# Patient Record
Sex: MALE | Race: OTHER
[De-identification: names, ages, dates, MRNs, and addresses within clinical notes are randomized per-mention and may not be internally consistent; named-entity substitution may affect disease eponyms.]

---

## 2019-01-05 ENCOUNTER — HOSPITAL ENCOUNTER (EMERGENCY)
Dept: HOSPITAL 1 - ED | Age: 39
Discharge: HOME | End: 2019-01-05
Payer: SELF-PAY

## 2019-01-05 VITALS — DIASTOLIC BLOOD PRESSURE: 90 MMHG | SYSTOLIC BLOOD PRESSURE: 145 MMHG

## 2019-01-05 VITALS
BODY MASS INDEX: 33.07 KG/M2 | BODY MASS INDEX: 33.07 KG/M2 | WEIGHT: 223.31 LBS | HEIGHT: 69 IN | WEIGHT: 223.31 LBS | HEIGHT: 69 IN
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
## 2019-01-05 DIAGNOSIS — T62.8X1A: Primary | ICD-10-CM

## 2021-09-08 ENCOUNTER — HOSPITAL ENCOUNTER (EMERGENCY)
Dept: HOSPITAL 15 - ER | Age: 41
Discharge: LEFT BEFORE DISCHARGE | End: 2021-09-08
Payer: COMMERCIAL

## 2021-09-08 VITALS — DIASTOLIC BLOOD PRESSURE: 96 MMHG | SYSTOLIC BLOOD PRESSURE: 145 MMHG

## 2021-09-08 VITALS — BODY MASS INDEX: 33.33 KG/M2 | WEIGHT: 225 LBS | HEIGHT: 69 IN

## 2021-09-08 DIAGNOSIS — F15.10: ICD-10-CM

## 2021-09-08 DIAGNOSIS — Z53.29: ICD-10-CM

## 2021-09-08 DIAGNOSIS — T40.411A: Primary | ICD-10-CM

## 2021-09-08 DIAGNOSIS — F11.10: ICD-10-CM

## 2021-09-08 DIAGNOSIS — F12.10: ICD-10-CM
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
## 2021-09-08 LAB
ALBUMIN SERPL-MCNC: 3.8 G/DL (ref 3.4–5)
ALP SERPL-CCNC: 65 U/L (ref 45–117)
ALT SERPL-CCNC: 50 U/L (ref 16–61)
ANION GAP SERPL CALCULATED.3IONS-SCNC: 7 MMOL/L (ref 5–15)
BILIRUB SERPL-MCNC: 0.3 MG/DL (ref 0.2–1)
BUN SERPL-MCNC: 15 MG/DL (ref 7–18)
BUN/CREAT SERPL: 14.4
CALCIUM SERPL-MCNC: 8.6 MG/DL (ref 8.5–10.1)
CHLORIDE SERPL-SCNC: 104 MMOL/L (ref 98–107)
CK SERPL-CCNC: 269 U/L (ref 39–308)
CO2 SERPL-SCNC: 26 MMOL/L (ref 21–32)
GLUCOSE SERPL-MCNC: 106 MG/DL (ref 74–106)
HCT VFR BLD AUTO: 41 % (ref 41–53)
HGB BLD-MCNC: 13.7 G/DL (ref 13.5–17.5)
MAGNESIUM SERPL-MCNC: 2.2 MG/DL (ref 1.6–2.6)
MCH RBC QN AUTO: 31.3 PG (ref 28–32)
MCV RBC AUTO: 93.2 FL (ref 80–100)
NRBC BLD QL AUTO: 0 %
POTASSIUM SERPL-SCNC: 4.8 MMOL/L (ref 3.5–5.1)
PROT SERPL-MCNC: 7.5 G/DL (ref 6.4–8.2)
SODIUM SERPL-SCNC: 137 MMOL/L (ref 136–145)

## 2021-09-08 PROCEDURE — 80320 DRUG SCREEN QUANTALCOHOLS: CPT

## 2021-09-08 PROCEDURE — 36415 COLL VENOUS BLD VENIPUNCTURE: CPT

## 2021-09-08 PROCEDURE — 85025 COMPLETE CBC W/AUTO DIFF WBC: CPT

## 2021-09-08 PROCEDURE — 83735 ASSAY OF MAGNESIUM: CPT

## 2021-09-08 PROCEDURE — 80053 COMPREHEN METABOLIC PANEL: CPT

## 2021-09-08 PROCEDURE — 82550 ASSAY OF CK (CPK): CPT

## 2022-02-17 ENCOUNTER — HOSPITAL ENCOUNTER (EMERGENCY)
Dept: HOSPITAL 15 - ER | Age: 42
Discharge: LEFT BEFORE DISCHARGE | End: 2022-02-17
Payer: SELF-PAY

## 2022-02-17 VITALS — DIASTOLIC BLOOD PRESSURE: 88 MMHG | SYSTOLIC BLOOD PRESSURE: 142 MMHG

## 2022-02-17 VITALS — WEIGHT: 225 LBS | BODY MASS INDEX: 33.33 KG/M2 | HEIGHT: 69 IN

## 2022-02-17 DIAGNOSIS — L02.611: Primary | ICD-10-CM

## 2022-02-17 DIAGNOSIS — Z53.21: ICD-10-CM

## 2022-02-18 ENCOUNTER — HOSPITAL ENCOUNTER (EMERGENCY)
Dept: HOSPITAL 15 - ER | Age: 42
Discharge: HOME | End: 2022-02-18
Payer: SELF-PAY

## 2022-02-18 VITALS — BODY MASS INDEX: 33.33 KG/M2 | HEIGHT: 69 IN | WEIGHT: 225 LBS

## 2022-02-18 VITALS — DIASTOLIC BLOOD PRESSURE: 92 MMHG | SYSTOLIC BLOOD PRESSURE: 120 MMHG

## 2022-02-18 DIAGNOSIS — F17.200: ICD-10-CM
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
## 2022-02-18 DIAGNOSIS — F15.10: ICD-10-CM

## 2022-02-18 DIAGNOSIS — F12.10: ICD-10-CM

## 2022-02-18 DIAGNOSIS — S80.861A: ICD-10-CM

## 2022-02-18 DIAGNOSIS — F11.10: ICD-10-CM

## 2022-02-18 DIAGNOSIS — S80.862A: Primary | ICD-10-CM

## 2022-03-05 ENCOUNTER — HOSPITAL ENCOUNTER (EMERGENCY)
Dept: HOSPITAL 15 - ER | Age: 42
Discharge: HOME | End: 2022-03-05
Payer: SELF-PAY

## 2022-03-05 VITALS — HEIGHT: 69 IN | WEIGHT: 225 LBS | BODY MASS INDEX: 33.33 KG/M2

## 2022-03-05 VITALS — DIASTOLIC BLOOD PRESSURE: 90 MMHG | SYSTOLIC BLOOD PRESSURE: 133 MMHG
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
## 2022-03-05 DIAGNOSIS — S80.862A: Primary | ICD-10-CM
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
## 2022-03-05 DIAGNOSIS — F12.10: ICD-10-CM
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
## 2022-03-05 DIAGNOSIS — S80.861A: ICD-10-CM

## 2022-03-05 DIAGNOSIS — F15.10: ICD-10-CM

## 2022-03-05 PROCEDURE — 99284 EMERGENCY DEPT VISIT MOD MDM: CPT

## 2022-03-05 PROCEDURE — 96372 THER/PROPH/DIAG INJ SC/IM: CPT

## 2022-04-18 ENCOUNTER — HOSPITAL ENCOUNTER (EMERGENCY)
Dept: HOSPITAL 15 - ER | Age: 42
Discharge: LEFT BEFORE DISCHARGE | End: 2022-04-18
Payer: SELF-PAY

## 2022-04-18 VITALS — BODY MASS INDEX: 33.33 KG/M2 | WEIGHT: 225 LBS | HEIGHT: 69 IN

## 2022-04-18 VITALS — SYSTOLIC BLOOD PRESSURE: 113 MMHG | DIASTOLIC BLOOD PRESSURE: 95 MMHG
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
## 2022-04-18 DIAGNOSIS — Z53.21: ICD-10-CM

## 2022-04-18 DIAGNOSIS — T17.228A: Primary | ICD-10-CM

## 2022-04-18 PROCEDURE — 70360 X-RAY EXAM OF NECK: CPT

## 2022-04-19 ENCOUNTER — HOSPITAL ENCOUNTER (EMERGENCY)
Dept: HOSPITAL 15 - ER | Age: 42
Discharge: HOME | End: 2022-04-19
Payer: SELF-PAY

## 2022-04-19 VITALS — HEIGHT: 69 IN | BODY MASS INDEX: 33.33 KG/M2 | WEIGHT: 225 LBS

## 2022-04-19 VITALS — DIASTOLIC BLOOD PRESSURE: 99 MMHG | SYSTOLIC BLOOD PRESSURE: 144 MMHG

## 2022-04-19 DIAGNOSIS — J02.9: Primary | ICD-10-CM

## 2022-10-26 ENCOUNTER — HOSPITAL ENCOUNTER (EMERGENCY)
Dept: HOSPITAL 15 - ER | Age: 42
LOS: 1 days | Discharge: HOME | End: 2022-10-27
Payer: SELF-PAY

## 2022-10-26 VITALS — BODY MASS INDEX: 26.78 KG/M2 | WEIGHT: 180.78 LBS | HEIGHT: 69 IN

## 2022-10-26 VITALS — SYSTOLIC BLOOD PRESSURE: 146 MMHG | DIASTOLIC BLOOD PRESSURE: 91 MMHG

## 2022-10-26 DIAGNOSIS — Z79.1: ICD-10-CM

## 2022-10-26 DIAGNOSIS — F11.10: ICD-10-CM

## 2022-10-26 DIAGNOSIS — F12.10: ICD-10-CM

## 2022-10-26 DIAGNOSIS — Z79.2: ICD-10-CM

## 2022-10-26 DIAGNOSIS — F15.10: ICD-10-CM

## 2022-10-26 DIAGNOSIS — Z79.899: ICD-10-CM

## 2022-10-26 DIAGNOSIS — L03.113: ICD-10-CM

## 2022-10-26 DIAGNOSIS — Z59.00: ICD-10-CM

## 2022-10-26 DIAGNOSIS — L03.116: Primary | ICD-10-CM

## 2022-10-26 LAB
ALBUMIN SERPL-MCNC: 3.3 G/DL (ref 3.4–5)
ALP SERPL-CCNC: 92 U/L (ref 45–117)
ALT SERPL-CCNC: 26 U/L (ref 16–61)
ANION GAP SERPL CALCULATED.3IONS-SCNC: 7 MMOL/L (ref 5–15)
BILIRUB SERPL-MCNC: 0.4 MG/DL (ref 0.2–1)
BUN SERPL-MCNC: 11 MG/DL (ref 7–18)
BUN/CREAT SERPL: 10.4
CALCIUM SERPL-MCNC: 8.7 MG/DL (ref 8.5–10.1)
CHLORIDE SERPL-SCNC: 111 MMOL/L (ref 98–107)
CO2 SERPL-SCNC: 25 MMOL/L (ref 21–32)
GLUCOSE SERPL-MCNC: 101 MG/DL (ref 74–106)
HCT VFR BLD AUTO: 38.9 % (ref 41–53)
HGB BLD-MCNC: 13.3 G/DL (ref 13.5–17.5)
MCH RBC QN AUTO: 31.3 PG (ref 28–32)
MCV RBC AUTO: 91.8 FL (ref 80–100)
NRBC BLD QL AUTO: 0 %
POTASSIUM SERPL-SCNC: 4 MMOL/L (ref 3.5–5.1)
PROT SERPL-MCNC: 7.4 G/DL (ref 6.4–8.2)
SODIUM SERPL-SCNC: 143 MMOL/L (ref 136–145)

## 2022-10-26 PROCEDURE — 80053 COMPREHEN METABOLIC PANEL: CPT

## 2022-10-26 PROCEDURE — 99283 EMERGENCY DEPT VISIT LOW MDM: CPT

## 2022-10-26 PROCEDURE — 83605 ASSAY OF LACTIC ACID: CPT

## 2022-10-26 PROCEDURE — 36415 COLL VENOUS BLD VENIPUNCTURE: CPT

## 2022-10-26 PROCEDURE — 85025 COMPLETE CBC W/AUTO DIFF WBC: CPT

## 2022-10-26 PROCEDURE — 87040 BLOOD CULTURE FOR BACTERIA: CPT

## 2022-10-26 SDOH — ECONOMIC STABILITY - HOUSING INSECURITY: HOMELESSNESS UNSPECIFIED: Z59.00

## 2023-08-18 ENCOUNTER — HOSPITAL ENCOUNTER (EMERGENCY)
Dept: HOSPITAL 15 - ER | Age: 43
Discharge: HOME | End: 2023-08-18
Payer: MEDICAID

## 2023-08-18 VITALS
HEART RATE: 102 BPM | DIASTOLIC BLOOD PRESSURE: 85 MMHG | RESPIRATION RATE: 18 BRPM | SYSTOLIC BLOOD PRESSURE: 138 MMHG | TEMPERATURE: 98.5 F | OXYGEN SATURATION: 96 %

## 2023-08-18 VITALS — HEIGHT: 69 IN | BODY MASS INDEX: 30.24 KG/M2 | WEIGHT: 204.15 LBS

## 2023-08-18 DIAGNOSIS — L08.9: ICD-10-CM

## 2023-08-18 DIAGNOSIS — S60.512D: Primary | ICD-10-CM

## 2023-08-18 DIAGNOSIS — Z79.899: ICD-10-CM

## 2023-08-18 DIAGNOSIS — Z79.1: ICD-10-CM

## 2023-08-18 DIAGNOSIS — Z79.2: ICD-10-CM
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
## 2023-08-18 PROCEDURE — 90715 TDAP VACCINE 7 YRS/> IM: CPT

## 2023-08-18 PROCEDURE — 90471 IMMUNIZATION ADMIN: CPT

## 2023-12-21 ENCOUNTER — HOSPITAL ENCOUNTER (EMERGENCY)
Dept: HOSPITAL 15 - ER | Age: 43
Discharge: LEFT BEFORE DISCHARGE | End: 2023-12-21
Payer: MEDICAID

## 2023-12-21 DIAGNOSIS — Z48.00: Primary | ICD-10-CM

## 2023-12-21 DIAGNOSIS — Z53.21: ICD-10-CM

## 2023-12-22 ENCOUNTER — HOSPITAL ENCOUNTER (EMERGENCY)
Dept: HOSPITAL 15 - ER | Age: 43
Discharge: HOME | End: 2023-12-22
Payer: MEDICAID

## 2023-12-22 VITALS
HEART RATE: 104 BPM | OXYGEN SATURATION: 96 % | SYSTOLIC BLOOD PRESSURE: 130 MMHG | DIASTOLIC BLOOD PRESSURE: 84 MMHG | TEMPERATURE: 97.2 F | RESPIRATION RATE: 16 BRPM

## 2023-12-22 VITALS — HEIGHT: 69 IN | WEIGHT: 188.94 LBS | BODY MASS INDEX: 27.98 KG/M2

## 2023-12-22 DIAGNOSIS — S80.862A: Primary | ICD-10-CM
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
## 2023-12-22 DIAGNOSIS — Z87.891: ICD-10-CM
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
## 2023-12-22 DIAGNOSIS — Z79.899: ICD-10-CM

## 2023-12-22 DIAGNOSIS — F15.90: ICD-10-CM
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
## 2023-12-22 PROCEDURE — 96372 THER/PROPH/DIAG INJ SC/IM: CPT

## 2023-12-22 PROCEDURE — 99283 EMERGENCY DEPT VISIT LOW MDM: CPT

## 2025-01-15 ENCOUNTER — HOSPITAL ENCOUNTER (INPATIENT)
Dept: HOSPITAL 15 - ER | Age: 45
LOS: 3 days | Discharge: LEFT BEFORE DISCHARGE | DRG: 812 | End: 2025-01-18
Attending: STUDENT IN AN ORGANIZED HEALTH CARE EDUCATION/TRAINING PROGRAM | Admitting: STUDENT IN AN ORGANIZED HEALTH CARE EDUCATION/TRAINING PROGRAM
Payer: MEDICAID

## 2025-01-15 VITALS
SYSTOLIC BLOOD PRESSURE: 118 MMHG | TEMPERATURE: 98.9 F | DIASTOLIC BLOOD PRESSURE: 76 MMHG | OXYGEN SATURATION: 93 % | HEART RATE: 101 BPM | RESPIRATION RATE: 21 BRPM

## 2025-01-15 VITALS — RESPIRATION RATE: 20 BRPM | HEART RATE: 93 BPM | OXYGEN SATURATION: 88 %

## 2025-01-15 VITALS — RESPIRATION RATE: 21 BRPM | HEART RATE: 101 BPM | OXYGEN SATURATION: 93 %

## 2025-01-15 VITALS — OXYGEN SATURATION: 96 % | HEART RATE: 105 BPM | RESPIRATION RATE: 19 BRPM

## 2025-01-15 VITALS — BODY MASS INDEX: 38.8 KG/M2 | HEIGHT: 66 IN | WEIGHT: 241.41 LBS

## 2025-01-15 DIAGNOSIS — F19.10: ICD-10-CM

## 2025-01-15 DIAGNOSIS — Z83.3: ICD-10-CM

## 2025-01-15 DIAGNOSIS — Z59.00: ICD-10-CM

## 2025-01-15 DIAGNOSIS — F17.210: ICD-10-CM

## 2025-01-15 DIAGNOSIS — Z20.822: ICD-10-CM
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
## 2025-01-15 DIAGNOSIS — T50.991A: Primary | ICD-10-CM

## 2025-01-15 DIAGNOSIS — J69.0: ICD-10-CM

## 2025-01-15 DIAGNOSIS — R74.01: ICD-10-CM

## 2025-01-15 DIAGNOSIS — J96.01: ICD-10-CM

## 2025-01-15 DIAGNOSIS — E80.6: ICD-10-CM

## 2025-01-15 LAB
ANION GAP SERPL CALCULATED.3IONS-SCNC: 7 MMOL/L (ref 5–15)
BUN SERPL-MCNC: 28 MG/DL (ref 9–23)
BUN/CREAT SERPL: 22.4 (ref 10–20)
CALCIUM SERPL-MCNC: 9.2 MG/DL (ref 8.7–10.4)
CHLORIDE SERPL-SCNC: 110 MMOL/L (ref 98–107)
CO2 SERPL-SCNC: 24 MMOL/L (ref 20–31)
GLUCOSE SERPL-MCNC: 123 MG/DL (ref 74–106)
HCT VFR BLD AUTO: 39.6 % (ref 41–53)
HGB BLD-MCNC: 13.6 G/DL (ref 13.5–17.5)
MCH RBC QN AUTO: 32.3 PG (ref 28–32)
MCV RBC AUTO: 94 FL (ref 80–100)
NRBC BLD QL AUTO: 0.2 %
POTASSIUM SERPL-SCNC: 4.7 MMOL/L (ref 3.5–5.1)
PROT UR STRIP.AUTO-MCNC: (no result) MG/DL
SODIUM SERPL-SCNC: 141 MMOL/L (ref 136–145)

## 2025-01-15 PROCEDURE — 96375 TX/PRO/DX INJ NEW DRUG ADDON: CPT

## 2025-01-15 PROCEDURE — 86803 HEPATITIS C AB TEST: CPT

## 2025-01-15 PROCEDURE — 84439 ASSAY OF FREE THYROXINE: CPT

## 2025-01-15 PROCEDURE — 81001 URINALYSIS AUTO W/SCOPE: CPT

## 2025-01-15 PROCEDURE — 36415 COLL VENOUS BLD VENIPUNCTURE: CPT

## 2025-01-15 PROCEDURE — 85025 COMPLETE CBC W/AUTO DIFF WBC: CPT

## 2025-01-15 PROCEDURE — 86704 HEP B CORE ANTIBODY TOTAL: CPT

## 2025-01-15 PROCEDURE — 84481 FREE ASSAY (FT-3): CPT

## 2025-01-15 PROCEDURE — 80048 BASIC METABOLIC PNL TOTAL CA: CPT

## 2025-01-15 PROCEDURE — 99291 CRITICAL CARE FIRST HOUR: CPT

## 2025-01-15 PROCEDURE — 83880 ASSAY OF NATRIURETIC PEPTIDE: CPT

## 2025-01-15 PROCEDURE — 87804 INFLUENZA ASSAY W/OPTIC: CPT

## 2025-01-15 PROCEDURE — 80053 COMPREHEN METABOLIC PANEL: CPT

## 2025-01-15 PROCEDURE — 87340 HEPATITIS B SURFACE AG IA: CPT

## 2025-01-15 PROCEDURE — 86706 HEP B SURFACE ANTIBODY: CPT

## 2025-01-15 PROCEDURE — 94640 AIRWAY INHALATION TREATMENT: CPT

## 2025-01-15 PROCEDURE — 93005 ELECTROCARDIOGRAM TRACING: CPT

## 2025-01-15 PROCEDURE — 84443 ASSAY THYROID STIM HORMONE: CPT

## 2025-01-15 PROCEDURE — 71045 X-RAY EXAM CHEST 1 VIEW: CPT

## 2025-01-15 PROCEDURE — 80307 DRUG TEST PRSMV CHEM ANLYZR: CPT

## 2025-01-15 PROCEDURE — 96365 THER/PROPH/DIAG IV INF INIT: CPT

## 2025-01-15 PROCEDURE — 83036 HEMOGLOBIN GLYCOSYLATED A1C: CPT

## 2025-01-15 PROCEDURE — 87426 SARSCOV CORONAVIRUS AG IA: CPT

## 2025-01-15 PROCEDURE — 86703 HIV-1/HIV-2 1 RESULT ANTBDY: CPT

## 2025-01-15 PROCEDURE — 86708 HEPATITIS A ANTIBODY: CPT

## 2025-01-15 RX ADMIN — BUPRENORPHINE HYDROCHLORIDE AND NALOXONE HYDROCHLORIDE DIHYDRATE ONE TAB: 8; 2 TABLET SUBLINGUAL at 17:40

## 2025-01-15 RX ADMIN — CEFTRIAXONE SODIUM ONE MLS/HR: 1 INJECTION, POWDER, FOR SOLUTION INTRAMUSCULAR; INTRAVENOUS at 12:07

## 2025-01-15 RX ADMIN — ALBUTEROL SULFATE SCH MG: 2.5 SOLUTION RESPIRATORY (INHALATION) at 17:47

## 2025-01-15 RX ADMIN — METHYLPREDNISOLONE SODIUM SUCCINATE ONE MG: 125 INJECTION, POWDER, FOR SOLUTION INTRAMUSCULAR; INTRAVENOUS at 12:55

## 2025-01-15 RX ADMIN — IPRATROPIUM BROMIDE SCH MG: 0.5 SOLUTION RESPIRATORY (INHALATION) at 17:48

## 2025-01-15 RX ADMIN — METHYLPREDNISOLONE SODIUM SUCCINATE SCH MG: 40 INJECTION, POWDER, FOR SOLUTION INTRAMUSCULAR; INTRAVENOUS at 21:41

## 2025-01-15 RX ADMIN — ALBUTEROL SULFATE ONE MG: 2.5 SOLUTION RESPIRATORY (INHALATION) at 12:50

## 2025-01-15 SDOH — ECONOMIC STABILITY - HOUSING INSECURITY: HOMELESSNESS UNSPECIFIED: Z59.00

## 2025-01-15 NOTE — ED.PDOC
History of Present Illness


HPI Comments


44-year-old male who comes in with chief complaint of overdose on possible 

fentanyl.  The patient was currently homeless and a bystander saw him not 

breathing well so she called 911.  When the paramedics arrived, the patient had 

an oxygen saturation in the 70s.  The patient was then transported to our 

facility.  The patient was given Narcan IV push and then the patient became more

awake in his respiration rate went up to approximately 12-15.  At that time the 

patient then had an increased oxygen saturation.  Upon arrival, the patient was 

able to give his medical history and is in no distress.


Time Seen by MD:  10:09


Reviewed Notes:  Nurses Notes, Paramedic Notes, Medications, Allergies (No 

allergies to medications)


Allergies:  


Coded Allergies:  


     NO KNOWN ALLERGIES (Unverified , 1/15/25)


Information Source:  Patient, Emergency Med Personnel


Mode of Arrival:  EMS


Severity:  Moderate


Timing:  Days


Duration:  Since onset


Prehospital treatment:  Cardiac Monitor, IVF, Other (Narcan IV, Zofran 4 mg IV 

push)


Associated signs and symptoms


The patient was complaining of some nausea and did have an episode of vomiting 

on scene.





Past Medical History


PAST MEDICAL HISTORY:  Denies


Surgical History:  Denies all surgeries





Family History


Family History:  Family hx of DM





Social History


Smoker:  Cigarettes


Alcohol:  Occasionally


Drugs:  Marijuana, Methamphetamine, Other (Possible fentanyl)


Lives In:  Homeless





Constitutional:  denies: chills, diaphoresis, fatigue, fever, malaise, sweats, 

weakness, others


EENTM:  denies: blurred vision, double vision, ear bleeding, ear discharge, ear 

drainage, ear pain, ear ringing, eye pain, eye redness, hearing loss, mouth 

pain, mouth swelling, nasal discharge, nose bleeding, nose congestion, nose 

pain, photophobia, tearing, throat pain, throat swelling, voice changes, others


Respiratory:  reports: shortness of breath; denies: cough, hemoptysis, 

orthopnea, SOB at rest, SOB with excertion, stridor, wheezing, others


Cardiovascular:  denies: chest pain, dizzy spells, diaphoresis, Dyspnea on 

exertion, edema, irregular heart beat, left arm pain, lightheadedness, 

palpitations, PND, syncope, others


Gastrointestinal:  reports: nausea, vomiting; denies: abdomen distended, 

abdominal pain, blood streaked bowels, constipated, diarrhea, dysphagia, d

ifficulty swallowing, hematemesis, melena, poor appetite, poor fluid intake, 

rectal bleeding, rectal pain, others


Genitourinary:  denies: burning, dysuria, flank pain, frequency, hematuria, 

incontinence, penile discharge, penile sore, pain, testicle pain, testicle 

swelling, urgency, others


Neurological:  reports: others (Altered mental status); denies: dizziness, 

fainting, headache, left sided numbness, left sided weakness, numbness, 

paresthesia, pre-existing deficit, right sided numbness, right sided weakness, 

seizure, speech problems, tingling, tremors, weakness


Musculoskeletal:  denies: back pain, gout, joint pain, joint swelling, muscle 

pain, muscle stiffness, neck pain, others


Integumetry:  denies: bruises, change in color, change in hair/nails, dryness, 

laceration, lesions, lumps, rash, wounds, others


Allergic/Immunocompromised:  denies: Difficulty Healing, Frequent Infections, H

eladio, Itching, others


Hematologic/Lymphatic:  denies: anemia, blood clots, easy bleeding, easy 

bruising, swollen glands, others


Endocrine:  denies: excessive hunger, excessive sweating, excessive thirst, 

excessive urination, flushing, intolerance to cold, intolerance to heat, 

unexplained weight gain, unexplained weight loss, others


Psychiatric:  denies: anxiety, bipolar disorder, depression, hopeless, panic 

disorder, schizophrenia, sleepless, suicidal, others





Physical Exam


General Appearance:  Moderate Distress


HEENT:  Normal ENT Inspection, Pharynx Normal, TMs Normal


Neck:  Full Range of Motion, Non-Tender, Normal, Normal Inspection


Respiratory:  Chest Non-Tender, No Accessory Muscle Use, Rhonchi


Cardiovascular:  No Edema, No JVD, No Murmur, No Gallop, Normal Peripheral 

Pulses, Regular Rate/Rhythm


Breast Exam:  Deferred


Gastrointestinal:  No Organomegaly, Non Tender, No Pulsatile Mass, Normal Bowel 

Sounds, Soft


Genitalia:  Deferred


Pelvic:  Deferred


Rectal:  Deferred


Extremities:  No calf tenderness, Normal capillary refill, Normal inspection, 

Normal range of motion, Non-tender, No pedal edema


Musculoskeletal :  


   Apperance:  Normal


Neurologic:  Alert, CNs II-XII nml as Tested, No Motor Deficits, Normal Affect, 

Normal Mood, No Sensory Deficits


Cerebellar Function:  Normal


Reflexes:  Normal


Skin:  Dry, Normal Color, Warm


Lymphatic:  No Adenopathy





Was a procedure done?


Was a procedure done?:  No





EKG


EKG :  


   Pulse Rate (adult):  86


   Axis:  Normal


   Cardiac Rhythm:  NSR


   ST:  Nonsp





Differential Dx


Considerations may include:


Generalized weakness, electrolyte imbalance, overdose





X-Ray, Labs, Meds, VS





                                   Vital Signs








  Date Time  Temp Pulse Resp B/P (MAP) Pulse Ox O2 Delivery O2 Flow Rate FiO2


 


1/15/25 14:00  108 28 124/66 (85) 97   


 


1/15/25 13:29     89 Non-Rebreather 15 100





        100


 


1/15/25 12:50   25  89 Non-Rebreather 15 100





        100


 


1/15/25 12:02 76.6 87 18 126/67 (86) 90   





 76.6       


 


1/15/25 12:00  88      


 


1/15/25 11:20  88 19 126/67 (86) 93   


 


1/15/25 10:48 97.1 93 25 115/70 (85) 80   





 97.1       


 


1/15/25 10:45  93 20  88 Simple Mask* 10 99


 


1/15/25 10:36  86      


 


1/15/25 10:14  89      


 


1/15/25 10:07 98.7 97 12 110/70 (83) 82   








                                       Lab








Test


 1/15/25


11:38 1/15/25


10:57 Range/Units


 


 


White Blood Count


 7.4 


 


 4.4-10.8


10^3/uL


 


Red Blood Count


 4.22 L


 


 4.5-5.90


10^6/uL


 


Hemoglobin 13.6   13.5-17.5  g/dL


 


Hematocrit 39.6 L  41.0-53.0  %


 


Mean Corpuscular Volume 94.0   80.0-100.0  fL


 


Mean Corpuscular Hemoglobin 32.3 H  28.0-32.0  pg


 


Mean Corpuscular Hemoglobin


Concent 34.3 


 


 32.0-36.0  g/dL





 


Red Cell Distribution Width 14.7 H  11.8-14.3  %


 


Platelet Count


 323 


 


 140-450


10^3/uL


 


Mean Platelet Volume 7.2   6.9-10.8  fL


 


Neutrophils (%) (Auto) 80.6 H  37.0-80.0  %


 


Lymphocytes (%) (Auto) 10.6   10.0-50.0  %


 


Monocytes (%) (Auto) 8.7   0.0-12.0  %


 


Eosinophils (%) (Auto) 0.0   0.0-7.0  %


 


Basophils (%) (Auto) 0.1   0.0-2.0  %


 


Neutrophils # (Auto)


 6.0 


 


 1.6-8.6  10


^3/uL


 


Lymphocytes # (Auto)


 0.8 


 


 0.4-5.4  10


^3/uL


 


Monocytes # (Auto) 0.6   0-1.3  10 ^3/uL


 


Eosinophils # (Auto) 0   0-0.8  10 ^3/uL


 


Basophils # (Auto) 0   0-0.2  10 ^3/uL


 


Nucleated Red Blood Cells 0.2    %


 


Sodium Level 141   136-145  mmol/L


 


Potassium Level 4.7   3.5-5.1  mmol/L


 


Chloride Level 110 H    mmol/L


 


Carbon Dioxide Level 24   20-31  mmol/L


 


Anion Gap 7   5-15  


 


Blood Urea Nitrogen 28 H  9-23  mg/dL


 


Creatinine


 1.25 


 


 0.700-1.30


mg/dL


 


Glomerular Filtration Rate


Calc 74 


 


 >90  mL/min





 


BUN/Creatinine Ratio 22.4 H  10.0-20.0  


 


Serum Glucose 123 H    mg/dL


 


Calcium Level 9.2   8.7-10.4  mg/dL


 


Urine Color  Yellow  Yellow  


 


Urine Clarity  Clear  Clear  


 


Urine pH  5.5  5.0-9.0  


 


Urine Specific Gravity  1.029  1.001-1.035  


 


Urine Protein  1+ H Negative  


 


Urine Ketones  Negative  Negative  


 


Urine Blood  2+ H Negative  /uL


 


Urine Nitrite  Negative  Negative  


 


Urine Bilirubin  Negative  Negative  


 


Urine Urobilinogen  Normal  Negative  mg/dL


 


Urine Leukocyte Esterase  Negative  Negative  /uL


 


Urine RBC  4  0 - 3  /hpf


 


Urine WBC  1  0 - 3  /hpf


 


Urine Squamous Epithelial


Cells 


 Few 


 <5  /hpf





 


Urine Bacteria  None seen  None Seen  /hpf


 


Urine Hyaline Casts  Few  0 - 2  /lpf


 


Urine Mucus  Few  None Seen  


 


Urine Sperm  Present  None Seen  /hpf


 


Urine Glucose  Normal  Normal  mg/dL


 


Urine Opiates Screen  Neg  NEGATIVE  


 


Urine Fentanyl Screen  Pos  NEGATIVE  


 


Urine Barbiturates Screen  Neg  NEGATIVE  


 


Urine Phencyclidine Screen  Neg  NEGATIVE  


 


Urine Amphetamines Screen  Pos  NEGATIVE  


 


Urine Benzodiazepines Screen  Neg  NEGATIVE  


 


Urine Cocaine Screen  Neg  NEGATIVE  


 


Urine Cannabinoids Screen  Neg  NEGATIVE  








                               Current Medications








 Medications


  (Trade)  Dose


 Ordered  Sig/Kelsey


 Route  Start Time


 Stop Time Status Last Admin


 


 Ceftriaxone Sodium  50 ml @ 


 100 mls/hr  ONCE  ONCE


 IV  1/15/25 11:45


 1/15/25 12:14 DC 1/15/25 12:07





 


 Methylprednisolone


 Sodium Succinate


  (Solu Medrol)  125 mg  ONCE  ONCE


 IV  1/15/25 12:45


 1/15/25 12:46 DC 1/15/25 12:55





 


 Albuterol


  (Ventolin Medneb)  5 mg  ONCE  ONCE


 NEB  1/15/25 13:00


 1/15/25 13:01 DC 1/15/25 12:50








IV Hep-Lock has been established





CXR:


FINDINGS: 





Lines and Tubes: None





Lungs: Patchy right basilar opacity.





Pleura: No effusion.





No pneumothorax. 





Cardiomediastinal contours: Unremarkable





Bones: No acute osseous abnormality.





IMPRESSION:





1. Patchy right basilar opacity may represent atelectasis or pneumonia.





Electronically Signed by: Mahi Leonard at 01/15/2025 11:40:13 AM











DICTATED BY: MAHI LEONARD MD


DICTATED DATE/TIME: 01/15/25 1140





SIGNED BY: MAHI LEONARD MD


SIGNED DATE/TIME: 01/15/25 1140





CC: 


Images Reviewed?:  Images reviewed and evaluated by me


Time of 1ST Reevaluation:  10:36


Reevaluation 1ST:  Unchanged


Patient Education/Counseling:  Diagnosis, Treatment, Prognosis


Family Education/Counseling:  No Family Present





Departure 1


Departure


Time of Disposition:  12:52


Impression:  


   Primary Impression:  


   Accidental overdose


   Qualified Codes:  T50.901A - Poisoning by unspecified drugs, medicaments and 

   biological substances, accidental (unintentional), initial encounter


   Additional Impression:  


   Aspiration pneumonia


   Qualified Codes:  J69.0 - Pneumonitis due to inhalation of food and vomit


Disposition:  09 ADMITTED AS INPATIENT


Admit to:  Tele


Condition:  Fair





Critical Care Note


Critical Care Time?:  Yes (35 min-critical care time only)





Stability


Stability form required:  Yes


Unstable for transfer:  Telemetry monitoring (Telemetry monitoring required), ED

Physician Assesment (Clinical assesment)





Heart Score


Heart Score:  








Heart Score Response (Comments) Value


 


History N/A 0


 


EKG N/A 0


 


Age N/A 0


 


Risk Factors N/A 0


 


Troponin N/A 0


 


Total  0














I personally scribed for SUMMER BUENO MD ([email or web address]) on 1/15/25 at 11:53.  

Electronically submitted by Emily Reyes ([email or web address]).


I personally scribed for SUMMER BUENO MD ([email or web address]) on 1/15/25 at 11:53.  

Electronically submitted by Emily Reyes ([email or web address]).





SUMMER BUENO MD              Jose 15, 2025 10:36

## 2025-01-15 NOTE — DVH
CHEST RADIOGRAPH



Indication: sob/cough



Technique: Single frontal view of the chest was obtained



Comparison: None



FINDINGS: 



Lines and Tubes: None



Lungs: Patchy right basilar opacity.



Pleura: No effusion.



No pneumothorax. 



Cardiomediastinal contours: Unremarkable



Bones: No acute osseous abnormality.



IMPRESSION:



1. Patchy right basilar opacity may represent atelectasis or pneumonia.



Electronically Signed by: Mandi Leonard at 01/15/2025 11:40:13 AM

## 2025-01-15 NOTE — DVHHP2
History of Present Illness


Reason for Visit:  Drug overdose


History of Present Illness


Kevin Colbert is a 44-year-old male with no past medical history who presents 

to the ED with drug overdose.  Bystander on the field alerted EMS patient was 

brought into the ED. patient reports that he just got released yesterday from 

[facility] and is on Suboxone, was started initially at a clinic in [place].  Patient states that he uses drugs from time to time.  He is 

planning to move to [place] with his daughter due to the unfortunate events here.  

Patient denies any chest pain, shortness of breath, abdominal pain, fever, 

chills, nausea, vomiting, diarrhea, lightheadedness, dizziness, and headaches.  

Patient also reports that he is homeless and just moving about from place to 

place.


Past Surgical History:  None


Family History:  None


Smoke:  <1 pack per day


ALCOHOL:  occassional


Drugs:  Marijuana, Other (Methamphetamine and fentanyl)


Lives:  Homeless


Domestic Violence:  Neg





Review of Systems


Constitutional:  No: Fever, Chills, Sweats, Weakness, Malaise, Other


Eyes:  No: Pain, Vision change, Conjunctivae inflammation, Eyelid inflammation, 

Other, Redness


ENT:  No: Ear pain, Ear discharge, Nose pain, Nose discharge, Nose congestion, 

Mouth pain, Mouth swelling, Throat pain, Throat swelling, Other


Respiratory:  No: Cough, Dry, Shortness of breath, SOB with excertion, Wheezing,

Hemoptysis, Pleuritic Pain, Sputum, Wheezing, Other


Cardiovascular:  No: Chest Pain, Palpitations, Orthopnea, Paroxysmal Noc. 

Dyspnea, Edema, Lt Headedness, Other


Gastrointestinal:  No: Nausea, Vomiting, Abdominal Pain, Diarrhea, Constipation,

Melena, Hematochezia, Other


Genitourinary:  No Dysuria, No Frequency, No Incontinence, No Hematuria, No 

Retention, No Other


Musculoskeletal:  No: other, neck pain, shoulder pain, arm pain, back pain, hand

pain, leg pain, foot pain


Skin:  No: Rash, Lesions, Jaundice, Bruising, Other


Neurological:  No: Weakness, Numbness, Incoordination, Change in speech, 

Confusion, Seizures, Other


Allergies:  


Coded Allergies:  


     NO KNOWN ALLERGIES (Unverified , 1/15/25)





Exam


Vital Signs





Vital Signs








  Date Time  Temp Pulse Resp B/P (MAP) Pulse Ox O2 Delivery O2 Flow Rate FiO2


 


1/15/25 16:00  60      


 


1/15/25 16:00 98.9  26 131/89 (103) 92   





 98.9       


 


1/15/25 13:29      Non-Rebreather 15 100





        100








General Appearance:  Alert, Oriented X3, Cooperative, No acute distress


HEENT:  Atraumatic, PERRLA, EOMI, Mucous membr. moist/pink


Respiratory:  Normal air movement


Cardiovascular:  Normal S1, Normal S2, No murmurs


Abdominal:  Normal bowel sounds, Soft, No tenderness, No hepatospenomegaly, No 

masses


Extremities:  No clubbing, No cyanosis, No edema, Normal pulses, No 

tenderness/swelling


Skin:  No rashes, No breakdown, No significant lesion


Neuro:  Normal gait, Normal speech, Strength at 5/5 X4 ext, Normal tone, 

Sensation intact


Psych/Mental Status:  Mental status NL, Mood NL





Labs/Xrays





                                      Labs








Test


 1/15/25


11:38 1/15/25


10:57 Range/Units


 


 


White Blood Count


 7.4 


 


 4.4-10.8


10^3/uL


 


Red Blood Count


 4.22 L


 


 4.5-5.90


10^6/uL


 


Hemoglobin 13.6   13.5-17.5  g/dL


 


Hematocrit 39.6 L  41.0-53.0  %


 


Mean Corpuscular Volume 94.0   80.0-100.0  fL


 


Mean Corpuscular Hemoglobin 32.3 H  28.0-32.0  pg


 


Mean Corpuscular Hemoglobin


Concent 34.3 


 


 32.0-36.0  g/dL





 


Red Cell Distribution Width 14.7 H  11.8-14.3  %


 


Platelet Count


 323 


 


 140-450


10^3/uL


 


Mean Platelet Volume 7.2   6.9-10.8  fL


 


Neutrophils (%) (Auto) 80.6 H  37.0-80.0  %


 


Lymphocytes (%) (Auto) 10.6   10.0-50.0  %


 


Monocytes (%) (Auto) 8.7   0.0-12.0  %


 


Eosinophils (%) (Auto) 0.0   0.0-7.0  %


 


Basophils (%) (Auto) 0.1   0.0-2.0  %


 


Neutrophils # (Auto)


 6.0 


 


 1.6-8.6  10


^3/uL


 


Lymphocytes # (Auto)


 0.8 


 


 0.4-5.4  10


^3/uL


 


Monocytes # (Auto) 0.6   0-1.3  10 ^3/uL


 


Eosinophils # (Auto) 0   0-0.8  10 ^3/uL


 


Basophils # (Auto) 0   0-0.2  10 ^3/uL


 


Nucleated Red Blood Cells 0.2    %


 


Sodium Level 141   136-145  mmol/L


 


Potassium Level 4.7   3.5-5.1  mmol/L


 


Chloride Level 110 H    mmol/L


 


Carbon Dioxide Level 24   20-31  mmol/L


 


Anion Gap 7   5-15  


 


Blood Urea Nitrogen 28 H  9-23  mg/dL


 


Creatinine


 1.25 


 


 0.700-1.30


mg/dL


 


Glomerular Filtration Rate


Calc 74 


 


 >90  mL/min





 


BUN/Creatinine Ratio 22.4 H  10.0-20.0  


 


Serum Glucose 123 H    mg/dL


 


Calcium Level 9.2   8.7-10.4  mg/dL


 


Urine Color  Yellow  Yellow  


 


Urine Clarity  Clear  Clear  


 


Urine pH  5.5  5.0-9.0  


 


Urine Specific Gravity  1.029  1.001-1.035  


 


Urine Protein  1+ H Negative  


 


Urine Ketones  Negative  Negative  


 


Urine Blood  2+ H Negative  /uL


 


Urine Nitrite  Negative  Negative  


 


Urine Bilirubin  Negative  Negative  


 


Urine Urobilinogen  Normal  Negative  mg/dL


 


Urine Leukocyte Esterase  Negative  Negative  /uL


 


Urine RBC  4  0 - 3  /hpf


 


Urine WBC  1  0 - 3  /hpf


 


Urine Squamous Epithelial


Cells 


 Few 


 <5  /hpf





 


Urine Bacteria  None seen  None Seen  /hpf


 


Urine Hyaline Casts  Few  0 - 2  /lpf


 


Urine Mucus  Few  None Seen  


 


Urine Sperm  Present  None Seen  /hpf


 


Urine Glucose  Normal  Normal  mg/dL


 


Urine Opiates Screen  Neg  NEGATIVE  


 


Urine Fentanyl Screen  Pos  NEGATIVE  


 


Urine Barbiturates Screen  Neg  NEGATIVE  


 


Urine Phencyclidine Screen  Neg  NEGATIVE  


 


Urine Amphetamines Screen  Pos  NEGATIVE  


 


Urine Benzodiazepines Screen  Neg  NEGATIVE  


 


Urine Cocaine Screen  Neg  NEGATIVE  


 


Urine Cannabinoids Screen  Neg  NEGATIVE  








CHEST RADIOGRAPH





Indication: sob/cough





Technique: Single frontal view of the chest was obtained





Comparison: None





FINDINGS: 





Lines and Tubes: None





Lungs: Patchy right basilar opacity.





Pleura: No effusion.





No pneumothorax. 





Cardiomediastinal contours: Unremarkable





Bones: No acute osseous abnormality.





IMPRESSION:





1. Patchy right basilar opacity may represent atelectasis or pneumonia.





Assessment/Plan


Assessment/Plan


Assessment/Plan:





Aspiration PNA due to drug overdose


Respiratory treatments


Chest x-ray


Supportive oxygen


UA


IV Solu-Medrol 


IV antibiotics-ceftriaxone


Suboxone - patient states that he has been on it daily, clinic from [place] 

Kourtney and continuance at [facility]


EKG


Urine drug screen 


UA 


 - patient homeless


Labs


A.m. lab


Chest x-ray a.m.








FEN/PPX


diet


HL


DVT prophylaxis-patient ambulating not indicated


PUD prophylaxis-no history of GERD or GI bleed








Admit patient to med surge


Discussed plan of care with patient and nurse


No home medications reconciled


Plan discussed with:  Patient


My Orders





                           Orders - BASIL SELBY








Procedure Category Date Status





  Time 


 


Ceftriaxone Ivpb PHA 1/16/25 Verified





Rocephin  09:00 


 


Methylprednisolone PHA 1/15/25 Verified





Sod Succ (Solu Medrol  22:00 


 


*  CONS 1/15/25 Verified





Consult   


 


Albuterol Medneb PHA 1/15/25 Verified





 (Ventolin Medneb)  18:00 


 


Albuterol Medneb PHA 1/15/25 Verified





 (Ventolin Medneb)  17:15 


 


Ipratropium Medneb PHA 1/15/25 Verified





 (Atrovent Medneb)  18:00 


 


Ipratropium Medneb PHA 1/15/25 Verified





 (Atrovent Medneb)  17:15 


 


Buprenorphine/Naloxone PHA 1/15/25 Verified





8-2mg  17:15 


 


Admit ADMIT 1/15/25 Verified





  17:09 


 


Allergies ALVARADO 1/15/25 Verified





  17:09 


 


Code Status CODE 1/15/25 Verified





  17:09 


 


Ondansetron Hcl PHA 1/15/25 Verified





 (Zofran)  17:15 


 


Complete Blood Count LAB 1/16/25 Verified





  04:00 


 


Comprehensive LAB 1/16/25 Verified





Metabolic Panel  04:00 











Date of Service:  Jose 15, 2025


Billing Provider:  BASIL SELBY


Common Visit Codes:  99223-INITIAL  INP/OBS CARE (HIGH)











BASIL SELBY              Jose 15, 2025 17:27

## 2025-01-16 VITALS
DIASTOLIC BLOOD PRESSURE: 60 MMHG | OXYGEN SATURATION: 93 % | TEMPERATURE: 98.4 F | HEART RATE: 84 BPM | SYSTOLIC BLOOD PRESSURE: 108 MMHG | RESPIRATION RATE: 19 BRPM

## 2025-01-16 VITALS
DIASTOLIC BLOOD PRESSURE: 71 MMHG | RESPIRATION RATE: 20 BRPM | TEMPERATURE: 99.4 F | HEART RATE: 102 BPM | SYSTOLIC BLOOD PRESSURE: 117 MMHG | OXYGEN SATURATION: 92 %

## 2025-01-16 VITALS — HEART RATE: 89 BPM | RESPIRATION RATE: 18 BRPM | OXYGEN SATURATION: 94 %

## 2025-01-16 VITALS — RESPIRATION RATE: 18 BRPM | OXYGEN SATURATION: 94 % | HEART RATE: 92 BPM

## 2025-01-16 VITALS
DIASTOLIC BLOOD PRESSURE: 68 MMHG | OXYGEN SATURATION: 95 % | HEART RATE: 66 BPM | RESPIRATION RATE: 16 BRPM | SYSTOLIC BLOOD PRESSURE: 129 MMHG | TEMPERATURE: 98.5 F

## 2025-01-16 VITALS — RESPIRATION RATE: 20 BRPM | OXYGEN SATURATION: 96 % | HEART RATE: 92 BPM

## 2025-01-16 VITALS — HEART RATE: 88 BPM | OXYGEN SATURATION: 97 % | RESPIRATION RATE: 18 BRPM

## 2025-01-16 VITALS
SYSTOLIC BLOOD PRESSURE: 130 MMHG | TEMPERATURE: 98.3 F | RESPIRATION RATE: 16 BRPM | DIASTOLIC BLOOD PRESSURE: 84 MMHG | HEART RATE: 94 BPM | OXYGEN SATURATION: 93 %

## 2025-01-16 VITALS — OXYGEN SATURATION: 94 % | HEART RATE: 100 BPM | RESPIRATION RATE: 14 BRPM

## 2025-01-16 VITALS — OXYGEN SATURATION: 93 %

## 2025-01-16 LAB
ALBUMIN SERPL-MCNC: 4.1 G/DL (ref 3.2–4.8)
ALP SERPL-CCNC: 47 U/L (ref 46–116)
ALT SERPL-CCNC: 41 U/L (ref 7–40)
ANION GAP SERPL CALCULATED.3IONS-SCNC: 6 MMOL/L (ref 5–15)
BILIRUB SERPL-MCNC: 1.4 MG/DL (ref 0.2–1)
BUN SERPL-MCNC: 21 MG/DL (ref 9–23)
BUN/CREAT SERPL: 20.8 (ref 10–20)
CALCIUM SERPL-MCNC: 9.5 MG/DL (ref 8.7–10.4)
CHLORIDE SERPL-SCNC: 103 MMOL/L (ref 98–107)
CO2 SERPL-SCNC: 26 MMOL/L (ref 20–31)
GLUCOSE SERPL-MCNC: 111 MG/DL (ref 74–106)
HCT VFR BLD AUTO: 37.5 % (ref 41–53)
HGB BLD-MCNC: 12.7 G/DL (ref 13.5–17.5)
MCH RBC QN AUTO: 31.7 PG (ref 28–32)
MCV RBC AUTO: 94.1 FL (ref 80–100)
NRBC BLD QL AUTO: 0 %
POTASSIUM SERPL-SCNC: 4.9 MMOL/L (ref 3.5–5.1)
PROT SERPL-MCNC: 6.6 G/DL (ref 5.7–8.2)
SARS-COV+SARS-COV-2 AG RESP QL IA.RAPID: NEGATIVE
SODIUM SERPL-SCNC: 135 MMOL/L (ref 136–145)
T3FREE SERPL-MCNC: 2.77 PG/ML (ref 2.3–4.2)
T4 FREE SERPL-MCNC: 1.01 NG/DL (ref 0.89–1.76)

## 2025-01-16 RX ADMIN — PIPERACILLIN SODIUM AND TAZOBACTAM SODIUM ONE MLS/HR: .375; 3 INJECTION, POWDER, LYOPHILIZED, FOR SOLUTION INTRAVENOUS at 08:09

## 2025-01-16 RX ADMIN — PANTOPRAZOLE SODIUM SCH MG: 40 TABLET, DELAYED RELEASE ORAL at 16:53

## 2025-01-16 RX ADMIN — ALBUTEROL SULFATE PRN MG: 2.5 SOLUTION RESPIRATORY (INHALATION) at 19:27

## 2025-01-16 RX ADMIN — IPRATROPIUM BROMIDE PRN MG: 0.5 SOLUTION RESPIRATORY (INHALATION) at 19:28

## 2025-01-16 RX ADMIN — PIPERACILLIN SODIUM AND TAZOBACTAM SODIUM SCH MLS/HR: .375; 3 INJECTION, POWDER, LYOPHILIZED, FOR SOLUTION INTRAVENOUS at 14:00

## 2025-01-16 RX ADMIN — BUPRENORPHINE HYDROCHLORIDE AND NALOXONE HYDROCHLORIDE DIHYDRATE SCH TAB: 8; 2 TABLET SUBLINGUAL at 09:44

## 2025-01-16 NOTE — DVHPNRES
Progress Note


Date Seen:  Jan 16, 2025


Resident Creating Document:  ARNALDO HUANG RESIDENT


Medical Necessity Reason


Pt with a Central, PICC or Fol:  No





Subjective


Review of Systems


 This is a 44 years old male with no significant past medical history brought in

to ED by EMS with drug overdose. Patient reports that he just got released from 

[facility] and is on Suboxone started initially at a clinic in [place] [facility].  Patient has a history of methamphetamine abuse for last 16 years. 





 Constitutional:  No: Fever, Chills, Sweats, Weakness, Malaise, Other


Eyes:  No: Pain, Vision change, Conjunctivae inflammation, Eyelid inflammation, 

Other, Redness


ENT:  No: Ear pain, Ear discharge, Nose pain, Nose discharge, Nose congestion, 

Mouth pain, Mouth swelling, Throat pain, Throat swelling, Other


Respiratory:  Shortness of breath, improving, Cough, sputum, no Dry, Wheezing, 

Hemoptysis, Pleuritic Pain, Sputum, Wheezing, Other


Cardiovascular:  No: Chest Pain, Palpitations, Orthopnea, Paroxysmal Noc. 

Dyspnea, Edema, Lt Headedness, Other


Gastrointestinal:  No: Nausea, Vomiting, Abdominal Pain, Diarrhea, Constipation,

Melena, Hematochezia, Other


Musculoskeletal:  No: other, neck pain, shoulder pain, arm pain, back pain, hand

pain, leg pain, foot pain


Neurological:; No: Weakness, Numbness, Incoordination, Change in speech, 

Confusion, Seizures





Objective


vital signs





                                   Vital Sign








  Date Time  Temp Pulse Resp B/P (MAP) Pulse Ox O2 Delivery O2 Flow Rate FiO2


 


1/16/25 16:29 98.5 66 16 129/68 (88) 95   





 98.5       


 


1/16/25 08:00      Nasal Cannula* 2 28














                           Total Intake and Output   


 


 1/15/25 1/15/25 1/16/25





 15:00 23:00 07:00


 


Intake Total 50 ml  


 


Output Total  600 ml 


 


Balance 50 ml -600 ml 








medications





                               Current Medications








 Medications  Dose


 Ordered  Sig/Kelsey


 Route  Start Time


 Stop Time Status Last Admin


Dose Admin


 


 Albuterol  2.5 mg  Q4HPRN  PRN


 NEB  1/15/25 17:15


     





 


 Ipratropium


 Bromide  0.5 mg  Q4HPRN  PRN


 NEB  1/15/25 17:15


     





 


 Ondansetron HCl  4 mg  Q4HP  PRN


 IV  1/15/25 17:15


     





 


 Acetaminophen  650 mg  Q6HP  PRN


 PO  1/15/25 17:15


     





 


 Piperacillin Sod/


 Tazobactam Sod  100 ml @ 


 25 mls/hr  Q6HR


 IV  1/16/25 14:00


    1/16/25 14:00


25 MLS/HR


 


 Buprenorphine HCl  1 tab  DAILY


 SL  1/16/25 10:00


    1/16/25 09:44


1 TAB


 


 Pantoprazole


 Sodium  40 mg  DAILY@0600


 PO  1/16/25 16:45


    1/16/25 16:53


40 MG








Examination


 


Physical examination:








General Appearance:  Alert, Oriented X3, Cooperative, No acute distress


HEENT:  Atraumatic, PERRLA, EOMI, Mucous membrane moist/pink


Respiratory:  Right basal crackles.


Cardiovascular:  Regular rate, Normal S1, Normal S2, No murmurs, no chest wall 

tenderness


Abdominal:  Normal bowel sounds, Soft, No tenderness, No hepatospenomegaly, No 

masses


Extremities:  No clubbing, No cyanosis, No edema, Normal pulses, No 

tenderness/swelling


Skin:  No rashes, No breakdown, No significant lesion


Neuro:  Normal gait, Normal speech, Strength at 5/5 X4 ext, Normal tone, 

Sensation intact, grossly intact cranial nerves.


Psych/Mental Status:  Mental status NL, Mood NL


laboratory and microbiology


                                Laboratory Tests


1/16/25 05:40

















Test


 1/16/25


05:40 Range/Units


 


 


Serum Glucose 111 H   mg/dL








Labs and/or images reviewed:  Labs reviewed by me, Image(s) reviewed by me





Problem List/Assessment/Plan


Problem List/Assessment/Plan











Assessment and plan:





#  Acute hypoxic respiratory secondary to aspiration pneumonia


#  Aspiration pneumonia secondary to drug overdose


- Chest xray revealed Patchy right basilar opacity may represent atelectasis or 

pneumonia.


- CBC demonstrated leukocytosis


- Covid/ Flu negative


-  DuoNeb with albuterol and ipratropium q.4 p.r.n.


-  IV Zosyn 3.375 mg q.6 hours


- Incentive spirometry





#  Methamphetamine, marijuana and polysubstance abuse disorder


-  UDS is positive for fentanyl and amphetamine


-  Buprenorphine hydrochloride 1 tab SL daily


- Counseled the patient regarding drug abuse and rehabilitation





#  Hyperbilirubinemia with transaminitis


-  Hepatitis panel and HIV negative


- Monitor CMP





# Nicotine dependence


-  counseled patient regarding smoking cessation.








Diet : regular diet


PUD prophylaxis: Protonix 40 mg daily


DVT prophylaxis: Lovenox 40 mg SC daily


Disposition : Med-surge





Goal of care discussed with the patient for more than 20 minutes full code





Plan discussed with Dr. Anders


Plan discussed with:  Patient, Other





My Orders


My Orders





                        Orders - ARNALDO HUANG








Procedure Category Date Status





  Time 


 


Piperacillin-Tazob PHA 1/16/25 In Process





3.375gm (Zosyn 3.375g  14:00 


 


Quantiferon-Tb Gold LAB 1/16/25 In Process





  07:01 


 


Incentive Spirometry ORDERS 1/16/25 Transmitted





  07:01 


 


Buprenorphine PHA 1/16/25 In Process





Hcl-Naloxone Hcl  10:00 


 


Soc Telemed Psych CONS 1/16/25 Transmitted





Consult  11:13 


 


Pantoprazole Tablet PHA 1/16/25 In Process





 (Protonix Tablet)  16:45 











Date of Service:  Jan 16, 2025


Billing Provider:  MARCIE MONK MD


Common Visit Codes:  [id number] INP/OBS CARE(HIGH)











ARNALDO HUANG RESIDENT        Jan 16, 2025 18:40


MARCIE MONK MD           Jan 20, 2025 16:22

## 2025-01-16 NOTE — DVH
CHEST RADIOGRAPH



Indication: pna



Technique: 



Single frontal view of the chest was obtained



Comparison: XY CHEST PORTABLE on DOS: 1/15/25, XY CHEST PORTABLE on DOS: 1/15/25



FINDINGS: 



Lines and Tubes: None



Lungs: Patchy right basilar opacity.



Pleura: No effusion.



No pneumothorax. 



Cardiomediastinal contours: Unremarkable



Bones: No acute osseous abnormality.



IMPRESSION: 



1. Patchy right basilar opacity may represent atelectasis or pneumonia.



Electronically Signed by: Jaydon Balderas at 01/16/2025 06:24:42 AM

## 2025-01-16 NOTE — ECG
[facility]

                                       

Test Date:    2025-01-15               Test Time:    10:14:40

Pat Name:     JED CLEMENTE           Department:   ED

Patient ID:   [id number]           Room:         0223

Gender:       M                        Technician:   ELBERT

:          1980               Requested By: SUMMER BUENO

Order Number: [id number]        Reading MD:   Rajeev Schofield

                                 Measurements

Intervals                              Axis          

Rate:         89                       P:            46

KS:           153                      QRS:          85

QRSD:         85                       T:            49

QT:           372                                    

QTc:          453                                    

                           Interpretive Statements

Sinus rhythm

Electronically Signed On 2025 15:35:00 PST by Rajeev Schofield



Please click the below link to view image of tracing.

## 2025-01-17 VITALS — OXYGEN SATURATION: 98 % | RESPIRATION RATE: 16 BRPM | HEART RATE: 81 BPM

## 2025-01-17 VITALS
HEART RATE: 85 BPM | OXYGEN SATURATION: 94 % | RESPIRATION RATE: 18 BRPM | TEMPERATURE: 98.8 F | SYSTOLIC BLOOD PRESSURE: 115 MMHG | DIASTOLIC BLOOD PRESSURE: 66 MMHG

## 2025-01-17 VITALS
OXYGEN SATURATION: 94 % | HEART RATE: 72 BPM | RESPIRATION RATE: 20 BRPM | SYSTOLIC BLOOD PRESSURE: 106 MMHG | TEMPERATURE: 98.9 F | DIASTOLIC BLOOD PRESSURE: 82 MMHG

## 2025-01-17 VITALS — OXYGEN SATURATION: 94 % | HEART RATE: 85 BPM | RESPIRATION RATE: 18 BRPM

## 2025-01-17 VITALS
RESPIRATION RATE: 18 BRPM | HEART RATE: 81 BPM | TEMPERATURE: 99 F | SYSTOLIC BLOOD PRESSURE: 98 MMHG | OXYGEN SATURATION: 92 % | DIASTOLIC BLOOD PRESSURE: 53 MMHG

## 2025-01-17 VITALS
RESPIRATION RATE: 20 BRPM | HEART RATE: 100 BPM | OXYGEN SATURATION: 96 % | SYSTOLIC BLOOD PRESSURE: 112 MMHG | TEMPERATURE: 100.3 F | DIASTOLIC BLOOD PRESSURE: 60 MMHG

## 2025-01-17 VITALS
SYSTOLIC BLOOD PRESSURE: 102 MMHG | OXYGEN SATURATION: 90 % | RESPIRATION RATE: 18 BRPM | DIASTOLIC BLOOD PRESSURE: 67 MMHG | HEART RATE: 70 BPM | TEMPERATURE: 98 F

## 2025-01-17 VITALS
HEART RATE: 67 BPM | DIASTOLIC BLOOD PRESSURE: 71 MMHG | RESPIRATION RATE: 18 BRPM | SYSTOLIC BLOOD PRESSURE: 121 MMHG | TEMPERATURE: 99.6 F | OXYGEN SATURATION: 90 %

## 2025-01-17 VITALS — HEART RATE: 86 BPM | OXYGEN SATURATION: 94 % | RESPIRATION RATE: 18 BRPM

## 2025-01-17 VITALS — OXYGEN SATURATION: 95 % | RESPIRATION RATE: 16 BRPM | HEART RATE: 80 BPM

## 2025-01-17 VITALS — OXYGEN SATURATION: 92 % | RESPIRATION RATE: 18 BRPM | HEART RATE: 85 BPM

## 2025-01-17 VITALS — OXYGEN SATURATION: 92 %

## 2025-01-17 LAB
ANION GAP SERPL CALCULATED.3IONS-SCNC: 5 MMOL/L (ref 5–15)
BUN SERPL-MCNC: 29 MG/DL (ref 9–23)
BUN/CREAT SERPL: 26.6 (ref 10–20)
CALCIUM SERPL-MCNC: 9.1 MG/DL (ref 8.7–10.4)
CHLORIDE SERPL-SCNC: 103 MMOL/L (ref 98–107)
CO2 SERPL-SCNC: 28 MMOL/L (ref 20–31)
GLUCOSE SERPL-MCNC: 91 MG/DL (ref 74–106)
HCT VFR BLD AUTO: 32.5 % (ref 41–53)
HGB BLD-MCNC: 11.1 G/DL (ref 13.5–17.5)
MCH RBC QN AUTO: 32.2 PG (ref 28–32)
MCV RBC AUTO: 94.5 FL (ref 80–100)
NRBC BLD QL AUTO: 0 %
POTASSIUM SERPL-SCNC: 4.1 MMOL/L (ref 3.5–5.1)
SODIUM SERPL-SCNC: 136 MMOL/L (ref 136–145)

## 2025-01-17 RX ADMIN — SODIUM CHLORIDE SCH MLS/HR: 9 INJECTION, SOLUTION INTRAVENOUS at 17:40

## 2025-01-17 NOTE — DVHPNRES
Progress Note


Date Seen:  Jan 17, 2025


Resident Creating Document:  ARNALDO HUANG RESIDENT


Medical Necessity Reason


Pt with a Central, PICC or Fol:  No





Subjective


Review of Systems





 This is a 44 years old male with no significant past medical history brought in

to ED by EMS with drug overdose. Patient reports that he just got released from 

[place] and is on Suboxone started initially at a clinic in [place] [facility].  Patient has a history of methamphetamine abuse for last 16 years.





Patient was seen and examined on the bedside. He is alert, oriented x3. No 

overnight complaints. Mentioned improvement of shortness of breath and 

complaining of chest pain during deep breathing.





Objective


vital signs





                                   Vital Sign








  Date Time  Temp Pulse Resp B/P (MAP) Pulse Ox O2 Delivery O2 Flow Rate FiO2


 


1/17/25 13:00 98.9 72 20 106/82 (90) 94   





 98.9       


 


1/17/25 10:08      Nasal Cannula 1.0 


 


1/17/25 10:08        24














                           Total Intake and Output   


 


 1/16/25 1/16/25 1/17/25





 15:00 23:00 07:00


 


Intake Total   330 ml


 


Output Total   1 ml


 


Balance   329 ml








medications





                               Current Medications








 Medications  Dose


 Ordered  Sig/Kelsey


 Route  Start Time


 Stop Time Status Last Admin


Dose Admin


 


 Albuterol  2.5 mg  Q4HPRN  PRN


 NEB  1/15/25 17:15


    1/17/25 10:08


2.5 MG


 


 Ipratropium


 Bromide  0.5 mg  Q4HPRN  PRN


 NEB  1/15/25 17:15


    1/17/25 10:08


0.5 MG


 


 Ondansetron HCl  4 mg  Q4HP  PRN


 IV  1/15/25 17:15


     





 


 Acetaminophen  650 mg  Q6HP  PRN


 PO  1/15/25 17:15


     





 


 Buprenorphine HCl  1 tab  DAILY


 SL  1/16/25 10:00


    1/17/25 10:42


1 TAB


 


 Pantoprazole


 Sodium  40 mg  DAILY@0600


 PO  1/16/25 16:45


    1/17/25 05:48


40 MG


 


 Ampicillin Sodium/


 Sulbactam Sodium


 3 gm/Sodium


 Chloride  100 ml @ 


 50 mls/hr  Q6HR


 IV  1/17/25 18:00


     











Examination





Physical examination:








General Appearance:  Alert, Oriented X3, Cooperative, No acute distress


HEENT:  Atraumatic, PERRLA, EOMI, Mucous membrane moist/pink


Respiratory:  Mild crackles on the right lung base.


Cardiovascular:  Regular rate, Normal S1, Normal S2, No murmurs, no chest wall 

tenderness


Abdominal:  Normal bowel sounds, Soft, No tenderness, No hepatospenomegaly, No 

masses


Extremities:  No clubbing, No cyanosis, No edema, Normal pulses, No 

tenderness/swelling


Skin:  No rashes, No breakdown, No significant lesion


Neuro:  Normal gait, Normal speech, Strength at 5/5 X4 ext, Normal tone, 

Sensation intact, grossly intact cranial nerves.


Psych/Mental Status:  Mental status NL, Mood NL


laboratory and microbiology


                                Laboratory Tests


1/17/25 05:58

















Test


 1/17/25


05:58 Range/Units


 


 


Serum Glucose 91    mg/dL








Labs and/or images reviewed:  Labs reviewed by me, Image(s) reviewed by me





Problem List/Assessment/Plan


Problem List/Assessment/Plan








Assessment and plan:





#  Acute hypoxic respiratory secondary to aspiration pneumonia


#  Aspiration pneumonia secondary to drug overdose


- Chest xray revealed Patchy right basilar opacity may represent atelectasis or 

pneumonia.


- CBC demonstrated leukocytosis


- Covid/ Flu negative


-  DuoNeb with albuterol and ipratropium q.4 p.r.n.


-  Unasyn 3 gm IV Q6hr.


- Incentive spirometry





#  Methamphetamine, marijuana and polysubstance abuse disorder


-  UDS is positive for fentanyl and amphetamine


-  Buprenorphine hydrochloride 1 tab SL daily


- Counseled the patient regarding drug abuse and rehabilitation





#  Hyperbilirubinemia with transaminitis


-  Hepatitis panel and HIV negative


- Monitor CMP





# Nicotine dependence


-  counseled patient regarding smoking cessation.








Diet : regular diet


PUD prophylaxis: Protonix 40 mg daily


DVT prophylaxis: Lovenox 40 mg SC daily


Disposition : Med-surge





Goal of care discussed with the patient for more than 20 minutes full code





Plan discussed with Dr. Anders


Plan discussed with:  Patient





My Orders


My Orders





                        Orders - ARNALDO HUANG








Procedure Category Date Status





  Time 


 


Ampicillin & PHA 1/17/25 In Process





Sulbactam Sodium  18:00 


 


*Tele Psych Consult CONS 1/17/25 Transmitted





  11:15 











Date of Service:  Jan 17, 2025


Billing Provider:  MARCIE MONK MD


Common Visit Codes:  [id number] INP/OBS CARE(HIGH)











ARNALDO HUANG RESIDENT        Jan 17, 2025 16:38


MARCIE MONK MD           Jan 20, 2025 16:36

## 2025-01-18 VITALS
OXYGEN SATURATION: 91 % | RESPIRATION RATE: 20 BRPM | TEMPERATURE: 98.4 F | SYSTOLIC BLOOD PRESSURE: 103 MMHG | HEART RATE: 75 BPM | DIASTOLIC BLOOD PRESSURE: 68 MMHG

## 2025-01-18 VITALS
RESPIRATION RATE: 18 BRPM | HEART RATE: 80 BPM | DIASTOLIC BLOOD PRESSURE: 59 MMHG | TEMPERATURE: 97.9 F | OXYGEN SATURATION: 92 % | SYSTOLIC BLOOD PRESSURE: 121 MMHG

## 2025-01-18 VITALS
RESPIRATION RATE: 20 BRPM | OXYGEN SATURATION: 92 % | HEART RATE: 76 BPM | DIASTOLIC BLOOD PRESSURE: 67 MMHG | SYSTOLIC BLOOD PRESSURE: 141 MMHG | TEMPERATURE: 99.2 F

## 2025-01-18 VITALS
OXYGEN SATURATION: 95 % | HEART RATE: 91 BPM | RESPIRATION RATE: 18 BRPM | TEMPERATURE: 98.7 F | DIASTOLIC BLOOD PRESSURE: 75 MMHG | SYSTOLIC BLOOD PRESSURE: 124 MMHG

## 2025-01-18 LAB
ANION GAP SERPL CALCULATED.3IONS-SCNC: 5 MMOL/L (ref 5–15)
BUN SERPL-MCNC: 20 MG/DL (ref 9–23)
BUN/CREAT SERPL: 19.8 (ref 10–20)
CALCIUM SERPL-MCNC: 8.9 MG/DL (ref 8.7–10.4)
CHLORIDE SERPL-SCNC: 104 MMOL/L (ref 98–107)
CO2 SERPL-SCNC: 26 MMOL/L (ref 20–31)
GLUCOSE SERPL-MCNC: 82 MG/DL (ref 74–106)
HCT VFR BLD AUTO: 35.3 % (ref 41–53)
HGB BLD-MCNC: 12 G/DL (ref 13.5–17.5)
MCH RBC QN AUTO: 32 PG (ref 28–32)
MCV RBC AUTO: 94.3 FL (ref 80–100)
NRBC BLD QL AUTO: 0 %
POTASSIUM SERPL-SCNC: 3.8 MMOL/L (ref 3.5–5.1)
SODIUM SERPL-SCNC: 135 MMOL/L (ref 136–145)

## 2025-01-18 NOTE — DVHDSRES
Discharge Summary


Date of Admission


Resident Creating Document:  ARNALDO HUANG RESIDENT


Jose 15, 2025 at 17:09





Date of Discharge:


Jan 18, 2025





Admitting Diagnosis


Altered level of consciousness/drug overdose





Wounds:


No wounds present at this time.





Labs/Diagnostic Data:





                               Laboratory Results








Test


 1/18/25


05:58 1/16/25


07:57 1/16/25


07:27 1/16/25


05:40


 


White Blood Count


 9.3 10^3/uL


(4.4-10.8) 


 


 





 


Red Blood Count


 3.74 10^6/uL


(4.5-5.90) 


 


 





 


Hemoglobin


 12.0 g/dL


(13.5-17.5) 


 


 





 


Hematocrit


 35.3 %


(41.0-53.0) 


 


 





 


Mean Corpuscular Volume


 94.3 fL


(80.0-100.0) 


 


 





 


Mean Corpuscular Hemoglobin


 32.0 pg


(28.0-32.0) 


 


 





 


Mean Corpuscular Hemoglobin


Concent 34.0 g/dL


(32.0-36.0) 


 


 





 


Red Cell Distribution Width


 14.3 %


(11.8-14.3) 


 


 





 


Platelet Count


 271 10^3/uL


(140-450) 


 


 





 


Mean Platelet Volume


 8.0 fL


(6.9-10.8) 


 


 





 


Neutrophils (%) (Auto)


 68.2 %


(37.0-80.0) 


 


 





 


Lymphocytes (%) (Auto)


 23.9 %


(10.0-50.0) 


 


 





 


Monocytes (%) (Auto)


 6.6 %


(0.0-12.0) 


 


 





 


Eosinophils (%) (Auto)


 1.0 %


(0.0-7.0) 


 


 





 


Basophils (%) (Auto)


 0.3 %


(0.0-2.0) 


 


 





 


Neutrophils # (Auto)


 6.4 10 ^3/uL


(1.6-8.6) 


 


 





 


Lymphocytes # (Auto)


 2.2 10 ^3/uL


(0.4-5.4) 


 


 





 


Monocytes # (Auto)


 0.6 10 ^3/uL


(0-1.3) 


 


 





 


Eosinophils # (Auto)


 0.1 10 ^3/uL


(0-0.8) 


 


 





 


Basophils # (Auto)


 0 10 ^3/uL


(0-0.2) 


 


 





 


Nucleated Red Blood Cells 0.0 %    


 


Sodium Level


 135 mmol/L


(136-145) 


 


 





 


Potassium Level


 3.8 mmol/L


(3.5-5.1) 


 


 





 


Chloride Level


 104 mmol/L


() 


 


 





 


Carbon Dioxide Level


 26 mmol/L


(20-31) 


 


 





 


Anion Gap 5 (5-15)    


 


Blood Urea Nitrogen


 20 mg/dL


(9-23) 


 


 





 


Creatinine


 1.01 mg/dL


(0.700-1.30) 


 


 





 


Glomerular Filtration Rate


Calc 95 mL/min


(>90) 


 


 





 


BUN/Creatinine Ratio


 19.8


(10.0-20.0) 


 


 





 


Serum Glucose


 82 mg/dL


() 


 


 





 


Calcium Level


 8.9 mg/dL


(8.7-10.4) 


 


 





 


Influenza Type A Antigen


 


 Negative


(Negative) 


 





 


Influenza Type B Antigen


 


 Negative


(Negative) 


 





 


SARS-CoV-2 Antigen (Rapid)


 


 Negative


(NEGATIVE) 


 





 


Free Thyroxine (T4) Calculated


 


 


 1.01 ng/dL


(0.89-1.76) 





 


Free Triiodothyronine (T3)


pg/mL 


 


 2.77 pg/mL


(2.3-4.2) 





 


Hepatitis A Antibody Total


 


 


 Negative


(Negative) 





 


Hepatitis B Surface Antigen


 


 


 Negative


(Negative) 





 


Hepatitis B Surface Antibody


 


 


 Negative


(Negative) 





 


Hepatitis B Core Total


Antibody 


 


 Negative


(Negative) 





 


Hepatitis C Antibody


 


 


 Negative


(Negative) 





 


HIV (1&2) Antibody


 


 


 Negative


(Negative) 





 


Hemoglobin A1c


 


 


 


 5.5 % A1C


(<5.7)


 


Total Bilirubin


 


 


 


 1.4 mg/dL


(0.2-1.0)


 


Aspartate Amino Transferase


(AST) 


 


 


 79 U/L (13-40) 





 


Alanine Aminotransferase (ALT)    41 U/L (7-40) 


 


Alkaline Phosphatase


 


 


 


 47 U/L


()


 


B-Type Natriuretic Peptide


 


 


 


 66.39 pg/mL


(0-100)


 


Total Protein


 


 


 


 6.6 g/dL


(5.7-8.2)


 


Albumin


 


 


 


 4.1 g/dL


(3.2-4.8)


 


Thyroid Stimulating Hormone


(TSH) 


 


 


 0.48 uIU/mL


(0.55-4.78)


 


Test


 1/15/25


10:57 


 


 





 


Urine Color


 Yellow


(Yellow) 


 


 





 


Urine Clarity Clear (Clear)    


 


Urine pH 5.5 (5.0-9.0)    


 


Urine Specific Gravity


 1.029


(1.001-1.035) 


 


 





 


Urine Protein 1+ (Negative)    


 


Urine Ketones


 Negative


(Negative) 


 


 





 


Urine Blood


 2+ /uL


(Negative) 


 


 





 


Urine Nitrite


 Negative


(Negative) 


 


 





 


Urine Bilirubin


 Negative


(Negative) 


 


 





 


Urine Urobilinogen


 Normal mg/dL


(Negative) 


 


 





 


Urine Leukocyte Esterase


 Negative /uL


(Negative) 


 


 





 


Urine RBC 4 /hpf (0 - 3)    


 


Urine WBC 1 /hpf (0 - 3)    


 


Urine Squamous Epithelial


Cells Few /hpf (<5) 


 


 


 





 


Urine Bacteria


 None seen /hpf


(None Seen) 


 


 





 


Urine Hyaline Casts


 Few /lpf (0 -


2) 


 


 





 


Urine Mucus


 Few (None


Seen) 


 


 





 


Urine Sperm


 Present /hpf


(None Seen) 


 


 





 


Urine Glucose


 Normal mg/dL


(Normal) 


 


 





 


Urine Opiates Screen Neg (NEGATIVE)    


 


Urine Fentanyl Screen Pos (NEGATIVE)    


 


Urine Barbiturates Screen Neg (NEGATIVE)    


 


Urine Phencyclidine Screen Neg (NEGATIVE)    


 


Urine Amphetamines Screen Pos (NEGATIVE)    


 


Urine Benzodiazepines Screen Neg (NEGATIVE)    


 


Urine Cocaine Screen Neg (NEGATIVE)    


 


Urine Cannabinoids Screen Neg (NEGATIVE)    





                             Other Laboratory Tests


1/18/25 05:58











Brief Hx & Hospital Course:


 This is a 44 years old male with no significant past medical history brought in

to ED by EMS with drug overdose. Patient reports that he just got released from 

[facility] and is on Suboxone started initially at a clinic in [place].  Patient has a history of methamphetamine abuse for last 16 years.  

Initial chest x-ray was showing findings consistent with possible aspiration 

versus atelectasis.  Due to the fact that the patient was found unconscious, 

patient was started on treatment for possible aspiration pneumonia.  Patient was

started on IV Zosyn and was requiring oxygen supplementation through nasal 

cannula initially.  Antibiotics were changed to IV Unasyn and patient was 

started on buprenorphine one tab daily since the patient stated that he was 

taken this medication before for opioid withdrawal.  The patient was re-

evaluated today, currently on room air saturating 94%.  The patient denies 

shortness of breath, chest pain, fever/chills, lower extremity swelling or any 

other associated symptoms at this time.  We will discharge the patient on 

Augmentin 875 mg p.o. b.i.d. for five additional days to complete treatment.  

Patient agrees and understands the plan.





Admitting diagnosis:  Altered level of consciousness likely due to drug overdose





Discharge plan


-continue on Augmentin 875 mg p.o. b.i.d. for five additional days to complete 

treatment


-follow-up with primary care doctor in seven days





Consults/Reason for consult


N/A





Operations or Procedures





CHEST RADIOGRAPH





Indication: sob/cough





Technique: Single frontal view of the chest was obtained





Comparison: None





FINDINGS: 





Lines and Tubes: None





Lungs: Patchy right basilar opacity.





Pleura: No effusion.





No pneumothorax. 





Cardiomediastinal contours: Unremarkable





Bones: No acute osseous abnormality.





IMPRESSION:





1. Patchy right basilar opacity may represent atelectasis or pneumonia.











CHEST RADIOGRAPH





Indication: pna





Technique: 





Single frontal view of the chest was obtained





Comparison: XY CHEST PORTABLE on DOS: 1/15/25, XY CHEST PORTABLE on DOS: 1/15/25





FINDINGS: 





Lines and Tubes: None





Lungs: Patchy right basilar opacity.





Pleura: No effusion.





No pneumothorax. 





Cardiomediastinal contours: Unremarkable





Bones: No acute osseous abnormality.





IMPRESSION: 





1. Patchy right basilar opacity may represent atelectasis or pneumonia.





Condition at Discharge:


Good





Final Diagnosis/Problems List





Acute hypoxic respiratory failure secondary to aspiration pneumonia





Aspiration pneumonia secondary to drug overdose





Methamphetamine, marijuana and polysubstance abuse disorder





Hyperbilirubinemia with transaminitis





Nicotine dependence





Discharge Disposition:


Home





Discharge Instruct/Medications


Diet:  Regular


Activity:  No Restrictions, As Tolerated


Follow Up/Referral:  


F/U with his PCP in 1 week


Medications:  


Augmentin 875mg BID for 5 days


Discharge Statement:


"Patient was advised to return to the ER or call 911 if any headaches, 

dizziness, shortness of breath, chest pain, abdominal pain, bleeding, fevers, or

worsening of medical condition.





Patient was counseled about treatment plan, medications, possible side effects, 

patientverbalized understanding. All questions were answered to the best of my 

ability.





This discharge took greater then 30 minutes in planning, reviewing 

documentation, counseling the patient, and discussing with other team members."





ASSESSMENT


Acute hypoxic respiratory failure secondary to aspiration pneumonia


Aspiration pneumonia secondary to drug overdose


Methamphetamine, marijuana and polysubstance abuse disorder


Hyperbilirubinemia with transaminitis


Nicotine dependence











VINCENT PAZ RESIDENT Jan 18, 2025 12:35

## 2025-01-18 NOTE — [id number]
Date of Service if different f:  Jan 18, 2025





Consultation ([facility])


Progress:  Better


Labs





Laboratory Tests








Test


 1/15/25


10:57 1/16/25


05:40 1/16/25


07:27 1/16/25


07:57


 


Urine Color


 Yellow


(Yellow) 


 


 





 


Urine Clarity Clear (Clear)    


 


Urine pH 5.5 (5.0-9.0)    


 


Urine Specific Gravity


 1.029


(1.001-1.035) 


 


 





 


Urine Protein 1+ (Negative)    


 


Urine Ketones


 Negative


(Negative) 


 


 





 


Urine Blood


 2+ /uL


(Negative) 


 


 





 


Urine Nitrite


 Negative


(Negative) 


 


 





 


Urine Bilirubin


 Negative


(Negative) 


 


 





 


Urine Urobilinogen


 Normal mg/dL


(Negative) 


 


 





 


Urine Leukocyte Esterase


 Negative /uL


(Negative) 


 


 





 


Urine RBC 4 /hpf (0 - 3)    


 


Urine WBC 1 /hpf (0 - 3)    


 


Urine Squamous Epithelial


Cells Few /hpf (<5) 


 


 


 





 


Urine Bacteria


 None seen /hpf


(None Seen) 


 


 





 


Urine Hyaline Casts


 Few /lpf (0 -


2) 


 


 





 


Urine Mucus


 Few (None


Seen) 


 


 





 


Urine Sperm


 Present /hpf


(None Seen) 


 


 





 


Urine Glucose


 Normal mg/dL


(Normal) 


 


 





 


Urine Opiates Screen Neg (NEGATIVE)    


 


Urine Fentanyl Screen Pos (NEGATIVE)    


 


Urine Barbiturates Screen Neg (NEGATIVE)    


 


Urine Phencyclidine Screen Neg (NEGATIVE)    


 


Urine Amphetamines Screen Pos (NEGATIVE)    


 


Urine Benzodiazepines Screen Neg (NEGATIVE)    


 


Urine Cocaine Screen Neg (NEGATIVE)    


 


Urine Cannabinoids Screen Neg (NEGATIVE)    


 


Hemoglobin A1c


 


 5.5 % A1C


(<5.7) 


 





 


Total Bilirubin


 


 1.4 mg/dL


(0.2-1.0) 


 





 


Aspartate Amino Transf


(AST/SGOT) 


 79 U/L (13-40) 


 


 





 


Alanine Aminotransferase


(ALT/SGPT) 


 41 U/L (7-40) 


 


 





 


Alkaline Phosphatase


 


 47 U/L


() 


 





 


B-Type Natriuretic Peptide


 


 66.39 pg/mL


(0-100) 


 





 


Total Protein


 


 6.6 g/dL


(5.7-8.2) 


 





 


Albumin


 


 4.1 g/dL


(3.2-4.8) 


 





 


Thyroid Stimulating Hormone


(TSH) 


 0.48 uIU/mL


(0.55-4.78) 


 





 


Free Thyroxine (T4) Calculated


 


 


 1.01 ng/dL


(0.89-1.76) 





 


Free Triiodothyronine (T3)


pg/mL 


 


 2.77 pg/mL


(2.3-4.2) 





 


Hepatitis A Antibody Total


 


 


 Negative


(Negative) 





 


Hepatitis B Surface Antigen


 


 


 Negative


(Negative) 





 


Hepatitis B Surface Antibody


 


 


 Negative


(Negative) 





 


Hepatitis B Core Total


Antibody 


 


 Negative


(Negative) 





 


Hepatitis C Antibody


 


 


 Negative


(Negative) 





 


HIV (1&2) Antibody


 


 


 Negative


(Negative) 





 


Influenza Type A Antigen


 


 


 


 Negative


(Negative)


 


Influenza Type B Antigen


 


 


 


 Negative


(Negative)


 


SARS-CoV-2 Antigen (Rapid)


 


 


 


 Negative


(NEGATIVE)


 


Test


 1/18/25


05:58 


 


 





 


White Blood Count


 9.3 10^3/uL


(4.4-10.8) 


 


 





 


Red Blood Count


 3.74 10^6/uL


(4.5-5.90) 


 


 





 


Hemoglobin


 12.0 g/dL


(13.5-17.5) 


 


 





 


Hematocrit


 35.3 %


(41.0-53.0) 


 


 





 


Mean Corpuscular Volume


 94.3 fL


(80.0-100.0) 


 


 





 


Mean Corpuscular Hemoglobin


 32.0 pg


(28.0-32.0) 


 


 





 


Mean Corpuscular Hemoglobin


Concent 34.0 g/dL


(32.0-36.0) 


 


 





 


Red Cell Distribution Width


 14.3 %


(11.8-14.3) 


 


 





 


Platelet Count


 271 10^3/uL


(140-450) 


 


 





 


Mean Platelet Volume


 8.0 fL


(6.9-10.8) 


 


 





 


Neutrophils (%) (Auto)


 68.2 %


(37.0-80.0) 


 


 





 


Lymphocytes (%) (Auto)


 23.9 %


(10.0-50.0) 


 


 





 


Monocytes (%) (Auto)


 6.6 %


(0.0-12.0) 


 


 





 


Eosinophils (%) (Auto)


 1.0 %


(0.0-7.0) 


 


 





 


Basophils (%) (Auto)


 0.3 %


(0.0-2.0) 


 


 





 


Neutrophils # (Auto)


 6.4 10 ^3/uL


(1.6-8.6) 


 


 





 


Lymphocytes # (Auto)


 2.2 10 ^3/uL


(0.4-5.4) 


 


 





 


Monocytes # (Auto)


 0.6 10 ^3/uL


(0-1.3) 


 


 





 


Eosinophils # (Auto)


 0.1 10 ^3/uL


(0-0.8) 


 


 





 


Basophils # (Auto)


 0 10 ^3/uL


(0-0.2) 


 


 





 


Nucleated Red Blood Cells 0.0 %    


 


Sodium Level


 135 mmol/L


(136-145) 


 


 





 


Potassium Level


 3.8 mmol/L


(3.5-5.1) 


 


 





 


Chloride Level


 104 mmol/L


() 


 


 





 


Carbon Dioxide Level


 26 mmol/L


(20-31) 


 


 





 


Anion Gap 5 (5-15)    


 


Blood Urea Nitrogen


 20 mg/dL


(9-23) 


 


 





 


Creatinine


 1.01 mg/dL


(0.700-1.30) 


 


 





 


Glomerular Filtration Rate


Calc 95 mL/min


(>90) 


 


 





 


BUN/Creatinine Ratio


 19.8


(10.0-20.0) 


 


 





 


Serum Glucose


 82 mg/dL


() 


 


 





 


Calcium Level


 8.9 mg/dL


(8.7-10.4) 


 


 











Appetite:  Very Good


Side effects of medications:  No


Appearance:  Stated age


Psychomotor activity:  WNL


Behavioral:  Cooperative


Eye contact:  Appropriate


Speech:  WNL


Affect:  Appropriate


Mood:  Euthymic


Thought processes:  Linear/Goal-directed


Thought content:  WNL


Suicidal ideations:  Absent


Homicidal ideations:  Absent


Orientation:  Person, Place, Time, Situation


Memory intact:  Recent


Intellect:  Average


Abstractability:  WNL


Concentration:  Adequate


Attention:  Adequate


Judgement:  WNL


Insight:  Good


Vitals





Vital Signs








  Date Time  Temp Pulse Resp B/P (MAP) Pulse Ox O2 Delivery O2 Flow Rate FiO2


 


1/18/25 05:00 99.2 76 20 141/67 (91) 92   





 99.2       


 


1/17/25 20:00      Room Air* 0 21








Current medications





                               Current Medications








 Medications  Dose


 Ordered  Sig/Kelsey


 Route  Start Time


 Stop Time Status Last Admin


Dose Admin


 


 Albuterol  2.5 mg  Q4HPRN  PRN


 NEB  1/15/25 17:15


    1/17/25 19:19


2.5 MG


 


 Ipratropium


 Bromide  0.5 mg  Q4HPRN  PRN


 NEB  1/15/25 17:15


    1/17/25 10:08


0.5 MG


 


 Ondansetron HCl  4 mg  Q4HP  PRN


 IV  1/15/25 17:15


     





 


 Acetaminophen  650 mg  Q6HP  PRN


 PO  1/15/25 17:15


     





 


 Buprenorphine HCl  1 tab  DAILY


 SL  1/16/25 10:00


    1/17/25 10:42


1 TAB


 


 Pantoprazole


 Sodium  40 mg  DAILY@0600


 PO  1/16/25 16:45


    1/18/25 05:22


40 MG


 


 Ampicillin Sodium/


 Sulbactam Sodium


 3 gm/Sodium


 Chloride  100 ml @ 


 50 mls/hr  Q6HR


 IV  1/17/25 18:00


    1/18/25 05:22


50 MLS/HR








Medication adjusted:  No


Labs ordered:  No


Psychotherapy provided:  Yes (psychoeducation regarding Fentanyl/Suboxone 

dosing.)


Type:  Voluntary


Diagnosis:





F11.20





Plan : Dispo per primary team. No need for 5150. This seems to have been an 

unintentional overdose based on misestimation of fentanyl dose. The pt is 

currently considered to be a low risk of harm to self or others.





Since pt was stable on suboxone before and finds it helpful to stay abstinent 

from Fentanyl he should be continued on 8 mg BID which seems to be a dose that 

may be helpful to the patient as he was stable on 12 mg BID in the past but 

since he has been abstinent for 3 months, that dose may be too high.





Pt can be given Suboxone scripts to last him through his [employer] road trip to 

[place].





[facility] has a program where people who are in the county/state

for 72 hours can be eligible for a  ticket to return to their home state. The 

pt has been talking to [employer] daily to follow up on his case. They have provided 

him with a case number etc.


History of Present Illness


Reason for Consult : R/o suicide attempt.





HPI :





Pt says he in [place] for 3 months and was released on 1/14/25 and the next day he 

used Fentanyl at the same dose he had been using before and this caused him to 

overdose. Pt denies wanting to die or kill himself and denies this being an 

intentional overdose. Pt says he started to return to previous maladaptive 

behaviors. Pt denies feeling depressed or anxious, denies AVH, SI or HI. Denies 

all psych symptoms.





The pt is waiting for the PD to get him a [employer] ticket to [place]. Pt is 

awaiting the ticket to return to [place]. Pt has family there and [facility] 

[facility]





Past Psychiatric History :





Pt was abstinent for the 3 months but this is not because he wanted to stop 

using. Pt was getting 2 x 12 suboxone before but now would be happy with getting

2 x 8 mg daily because it helps with reducing cravings and helping him stay away

from bad people that he assosicates with and relapses and does questionable 

things.





Past Medical History :





PNA.





Social History :





Homeless, [facility]


Assessment/Diagnosis/Plan


Reviewed:  Care Plan











CONI RIVERA MD             Jan 18, 2025 10:29

## 2025-06-26 ENCOUNTER — HOSPITAL ENCOUNTER (EMERGENCY)
Dept: HOSPITAL 15 - ER | Age: 45
Discharge: HOME | End: 2025-06-26
Payer: MEDICAID

## 2025-06-26 VITALS — HEIGHT: 69 IN | BODY MASS INDEX: 30.11 KG/M2 | WEIGHT: 203.27 LBS

## 2025-06-26 VITALS
TEMPERATURE: 97.9 F | RESPIRATION RATE: 20 BRPM | HEART RATE: 65 BPM | OXYGEN SATURATION: 100 % | SYSTOLIC BLOOD PRESSURE: 153 MMHG | DIASTOLIC BLOOD PRESSURE: 86 MMHG

## 2025-06-26 DIAGNOSIS — H10.32: Primary | ICD-10-CM

## 2025-06-26 DIAGNOSIS — F17.210: ICD-10-CM

## 2025-06-26 RX ADMIN — IBUPROFEN ONE MG: 600 TABLET, FILM COATED ORAL at 14:07

## 2025-06-26 RX ADMIN — TETRACAINE HYDROCHLORIDE ONE DROP: 5 SOLUTION OPHTHALMIC at 13:58
